# Patient Record
Sex: MALE | NOT HISPANIC OR LATINO | Employment: UNEMPLOYED | URBAN - METROPOLITAN AREA
[De-identification: names, ages, dates, MRNs, and addresses within clinical notes are randomized per-mention and may not be internally consistent; named-entity substitution may affect disease eponyms.]

---

## 2019-05-20 ENCOUNTER — TELEPHONE (OUTPATIENT)
Dept: GASTROENTEROLOGY | Facility: CLINIC | Age: 24
End: 2019-05-20

## 2019-05-22 ENCOUNTER — OFFICE VISIT (OUTPATIENT)
Dept: GASTROENTEROLOGY | Facility: CLINIC | Age: 24
End: 2019-05-22
Payer: COMMERCIAL

## 2019-05-22 VITALS
SYSTOLIC BLOOD PRESSURE: 121 MMHG | HEIGHT: 68 IN | TEMPERATURE: 96.6 F | WEIGHT: 140 LBS | BODY MASS INDEX: 21.22 KG/M2 | HEART RATE: 51 BPM | DIASTOLIC BLOOD PRESSURE: 66 MMHG

## 2019-05-22 DIAGNOSIS — R10.13 EPIGASTRIC ABDOMINAL PAIN: ICD-10-CM

## 2019-05-22 DIAGNOSIS — K21.9 GASTROESOPHAGEAL REFLUX DISEASE, ESOPHAGITIS PRESENCE NOT SPECIFIED: Primary | ICD-10-CM

## 2019-05-22 PROCEDURE — 99244 OFF/OP CNSLTJ NEW/EST MOD 40: CPT | Performed by: INTERNAL MEDICINE

## 2019-05-22 RX ORDER — PANTOPRAZOLE SODIUM 40 MG/1
40 TABLET, DELAYED RELEASE ORAL DAILY
Qty: 60 TABLET | Refills: 3 | Status: SHIPPED | OUTPATIENT
Start: 2019-05-22 | End: 2019-12-16

## 2019-05-29 ENCOUNTER — HOSPITAL ENCOUNTER (OUTPATIENT)
Dept: ULTRASOUND IMAGING | Facility: MEDICAL CENTER | Age: 24
Discharge: HOME | End: 2019-05-29
Attending: INTERNAL MEDICINE
Payer: COMMERCIAL

## 2019-05-29 DIAGNOSIS — K21.9 GASTROESOPHAGEAL REFLUX DISEASE, ESOPHAGITIS PRESENCE NOT SPECIFIED: ICD-10-CM

## 2019-05-29 PROCEDURE — 76705 ECHO EXAM OF ABDOMEN: CPT

## 2019-06-07 ENCOUNTER — ANESTHESIA EVENT (OUTPATIENT)
Dept: GASTROENTEROLOGY | Facility: MEDICAL CENTER | Age: 24
End: 2019-06-07

## 2019-06-10 ENCOUNTER — ANESTHESIA (OUTPATIENT)
Dept: GASTROENTEROLOGY | Facility: MEDICAL CENTER | Age: 24
End: 2019-06-10

## 2019-06-10 ENCOUNTER — HOSPITAL ENCOUNTER (OUTPATIENT)
Dept: GASTROENTEROLOGY | Facility: MEDICAL CENTER | Age: 24
Setting detail: OUTPATIENT SURGERY
Discharge: HOME | End: 2019-06-10
Attending: INTERNAL MEDICINE | Admitting: INTERNAL MEDICINE
Payer: COMMERCIAL

## 2019-06-10 VITALS
HEART RATE: 46 BPM | SYSTOLIC BLOOD PRESSURE: 125 MMHG | TEMPERATURE: 98.2 F | WEIGHT: 136 LBS | BODY MASS INDEX: 20.61 KG/M2 | OXYGEN SATURATION: 99 % | RESPIRATION RATE: 16 BRPM | HEIGHT: 68 IN | DIASTOLIC BLOOD PRESSURE: 73 MMHG

## 2019-06-10 DIAGNOSIS — K21.9 GASTROESOPHAGEAL REFLUX DISEASE, ESOPHAGITIS PRESENCE NOT SPECIFIED: ICD-10-CM

## 2019-06-10 PROCEDURE — 88305 TISSUE EXAM BY PATHOLOGIST: CPT | Performed by: PATHOLOGY

## 2019-06-10 PROCEDURE — 43239 EGD BIOPSY SINGLE/MULTIPLE: CPT | Performed by: INTERNAL MEDICINE

## 2019-06-10 RX ORDER — PROPOFOL 10 MG/ML
INJECTION, EMULSION INTRAVENOUS AS NEEDED
Status: DISCONTINUED | OUTPATIENT
Start: 2019-06-10 | End: 2019-06-10 | Stop reason: SURG

## 2019-06-10 RX ORDER — SODIUM CHLORIDE 9 MG/ML
125 INJECTION, SOLUTION INTRAVENOUS CONTINUOUS
Status: DISCONTINUED | OUTPATIENT
Start: 2019-06-10 | End: 2019-06-14 | Stop reason: HOSPADM

## 2019-06-10 RX ADMIN — PROPOFOL 50 MG: 10 INJECTION, EMULSION INTRAVENOUS at 13:59

## 2019-06-10 RX ADMIN — PROPOFOL 120 MG: 10 INJECTION, EMULSION INTRAVENOUS at 13:55

## 2019-06-10 RX ADMIN — SODIUM CHLORIDE 125 ML/HR: 0.9 INJECTION, SOLUTION INTRAVENOUS at 13:04

## 2019-12-16 ENCOUNTER — OFFICE VISIT (OUTPATIENT)
Dept: GASTROENTEROLOGY | Facility: CLINIC | Age: 24
End: 2019-12-16
Payer: COMMERCIAL

## 2019-12-16 VITALS
SYSTOLIC BLOOD PRESSURE: 122 MMHG | HEART RATE: 72 BPM | TEMPERATURE: 97.8 F | WEIGHT: 130 LBS | DIASTOLIC BLOOD PRESSURE: 68 MMHG | HEIGHT: 68 IN | BODY MASS INDEX: 19.7 KG/M2

## 2019-12-16 DIAGNOSIS — K21.9 GASTROESOPHAGEAL REFLUX DISEASE WITHOUT ESOPHAGITIS: ICD-10-CM

## 2019-12-16 DIAGNOSIS — R14.2 BELCHING: Primary | ICD-10-CM

## 2019-12-16 PROCEDURE — 99214 OFFICE O/P EST MOD 30 MIN: CPT | Performed by: PHYSICIAN ASSISTANT

## 2019-12-16 NOTE — PROGRESS NOTES
[facility] - Outpatient Follow-up Note  Davide Gomes 27 y o  male MRN: [id number]  Encounter: [id number]          ASSESSMENT AND PLAN:      1  Belchin  Epigastric discomfort:  3  GERD: he admits to post-prandial belching as well as epigastric discomfort and acid reflux  He states the continued belching leads to regurgitation of food  He underwent work up including cbc, cmp, h pylori, ttg IgA, u/s and EGD that were all normal  This is likely acid reflux  He tried protonix and zantac without relief  We will try nexium and pepcid  If no relief, we could consider 24 hour pH testing   -gerd diet handout given and discussed  - esomeprazole (NexIUM) 20 mg capsule; Take 1 capsule (20 mg total) by mouth 2 (two) times a day before meals  Dispense: 60 capsule; Refill: 3  -may use pepcid PRN for breakthrough symptoms  -consider 24 hr pH testing if no relief    ______________________________________________________________________    SUBJECTIVE:  Davide Gomes is a 28 yo male here for follow up of belching  He had returned from studying abroad and states at the end of his trip he started having severe belching after meals and abdominal discomfort  It has continued since he has returned home  Work up including h pylori, celiac, cbc, cmp, u/s and EGD with biopsy have all been normal  He denies pain, nausea, change in bowel habits  He denies change in appetite or weight loss  REVIEW OF SYSTEMS IS OTHERWISE NEGATIVE        Historical Information   Past Medical History:   Diagnosis Date    GERD (gastroesophageal reflux disease)      Past Surgical History:   Procedure Laterality Date    CARDIAC SURGERY      valve repair    EGD  06/10/2019    MYRINGOTOMY W/ TUBES Right 2018    Dr Nito Toussaint - office procedure    PATENT FORAMEN OVALE CLOSURE  1997    WISDOM TOOTH EXTRACTION       Social History   Social History     Substance and Sexual Activity   Alcohol Use Yes    Frequency: 2-4 times a month     Social History     Substance and Sexual Activity   Drug Use Yes    Types: Marijuana     Social History     Tobacco Use   Smoking Status Never Smoker   Smokeless Tobacco Never Used     Family History   Problem Relation Age of Onset    No Known Problems Mother     No Known Problems Father     Breast cancer Paternal Aunt     Stroke Paternal Uncle        Meds/Allergies       Current Outpatient Medications:     ciprofloxacin-dexamethasone (CIPRODEX) otic suspension    esomeprazole (NexIUM) 20 mg capsule    No Known Allergies        Objective     Blood pressure 122/68, pulse 72, temperature 97 8 °F (36 6 °C), temperature source Tympanic, height 5' 8" (1 727 m), weight 59 kg (130 lb)  Body mass index is 19 77 kg/m²  PHYSICAL EXAM:      General Appearance:   Alert, cooperative, no distress   HEENT:   Normocephalic, atraumatic, anicteric  Right eye exhibits no discharge  Left eye exhibits no discharge  No scleral icterus    Neck:  Supple, symmetrical, trachea midline, no stridor    Lungs:   Clear to auscultation bilaterally; no rales, rhonchi or wheezing; respirations unlabored    Heart[de-identified]   Regular rate and rhythm; no murmur, rub, or gallop  Abdomen:   Soft, non-tender, non-distended; normal bowel sounds; no masses, no organomegaly    Genitalia:   Deferred    Rectal:   Deferred    Extremities:  No cyanosis, clubbing or edema        Skin:  No jaundice, rashes, or lesions          Lab Results:   No visits with results within 1 Day(s) from this visit     Latest known visit with results is:   Hospital Outpatient Visit on 06/10/2019   Component Date Value    Case Report 06/10/2019                      Value:Surgical Pathology Report                         Case: [id number]                                   Authorizing Provider:  Efraín Hernandez DO        Collected:           06/10/2019 1358              Ordering Location:     [address] [place] [address]        Received:            06/10/2019 [address]124 [address] Endoscopy                                                     Pathologist:           Juan Jose Brown MD                                                                Specimens:   A) - Duodenum, duodenum biopsy/ normal                                                              B) - Stomach, stomach biopsy/ normal                                                       Final Diagnosis 06/10/2019                      Value: This result contains rich text formatting which cannot be displayed here   Note 06/10/2019                      Value: This result contains rich text formatting which cannot be displayed here   Additional Information 06/10/2019                      Value: This result contains rich text formatting which cannot be displayed here  [facility] Description 06/10/2019                      Value: This result contains rich text formatting which cannot be displayed here   Clinical Information 06/10/2019                      Value:Gastroesophageal reflux disease, esophagitis presence not specified         Radiology Results:   No results found

## 2020-08-11 ENCOUNTER — TELEPHONE (OUTPATIENT)
Dept: GASTROENTEROLOGY | Facility: AMBULARY SURGERY CENTER | Age: 25
End: 2020-08-11

## 2020-08-11 NOTE — TELEPHONE ENCOUNTER
Patients GI provider:  Dr Wharton [facility]    Number to return call: [phone]    Reason for call: Pt scheduled TEAM Virtual Visit     email confirmed    Scheduled procedure/appointment date if applicable: Apt/procedure 9/14/20

## 2020-09-14 ENCOUNTER — TELEMEDICINE (OUTPATIENT)
Dept: GASTROENTEROLOGY | Facility: CLINIC | Age: 25
End: 2020-09-14
Payer: COMMERCIAL

## 2020-09-14 VITALS — WEIGHT: 135 LBS | BODY MASS INDEX: 20.53 KG/M2

## 2020-09-14 DIAGNOSIS — K21.9 GASTROESOPHAGEAL REFLUX DISEASE WITHOUT ESOPHAGITIS: Primary | ICD-10-CM

## 2020-09-14 PROCEDURE — 99214 OFFICE O/P EST MOD 30 MIN: CPT | Performed by: INTERNAL MEDICINE

## 2020-09-14 RX ORDER — MELOXICAM 15 MG/1
15 TABLET ORAL DAILY
COMMUNITY
Start: 2020-08-13

## 2020-09-14 NOTE — PROGRESS NOTES
[facility] - Outpatient Follow-up Note  Shirley Garcia 27 y o  male MRN: [id number]  Encounter: [id number]          ASSESSMENT AND PLAN:      GERD  Belching  Regurgitation     His symptoms could be secondary to reflux but he has no significant change in his symptoms while taking PPI and H2RA  His EGD with gastric and duodenal biopsy were normal    We reviewed reflux precautions  Advised small and frequent diet  24 PH/impedance and manometry study for further evaluation for esophageal dysmotility and reflux  Follow up with Dr Everette Wade  ______________________________________________________________________    SUBJECTIVE:  27year old seen for follow up visit for GERD  He continues to have epigastric discomfort and regurgitation  His symptoms are worse after eating greasy/oily foods  He has no nausea or vomiting but reports sour taste  Sometimes her regurgitate undigested food  No bloating or constipation  He has bowel movement every day  1-2 loose bowel movement per day    Stopped esomeprazole and nexium coupe of weeks ago    No weight loss  No dysphagia  No chest pain  He had normal EGD with biopsy from stomach and duodenum    REVIEW OF SYSTEMS IS OTHERWISE NEGATIVE        Historical Information   Past Medical History:   Diagnosis Date    GERD (gastroesophageal reflux disease)      Past Surgical History:   Procedure Laterality Date    CARDIAC SURGERY      valve repair    EGD  06/10/2019    MYRINGOTOMY W/ TUBES Right 12/19/2018    Dr Radha Viera - office procedure    PATENT FORAMEN OVALE CLOSURE  1997    WISDOM TOOTH EXTRACTION       Social History   Social History     Substance and Sexual Activity   Alcohol Use Yes    Frequency: 2-4 times a month     Social History     Substance and Sexual Activity   Drug Use Yes    Types: Marijuana     Social History     Tobacco Use   Smoking Status Never Smoker   Smokeless Tobacco Never Used     Family History   Problem Relation Age of Onset  No Known Problems Mother     No Known Problems Father     Breast cancer Paternal Aunt     Stroke Paternal Uncle        Meds/Allergies       Current Outpatient Medications:     meloxicam (MOBIC) 15 mg tablet    ciprofloxacin-dexamethasone (CIPRODEX) otic suspension    esomeprazole (NexIUM) 20 mg capsule    Allergies   Allergen Reactions    Pollen Extract Sneezing           Objective     Weight 61 2 kg (135 lb)  Body mass index is 20 53 kg/m²  PHYSICAL EXAM:      General Appearance:   Alert, cooperative, no distress   HEENT:   Normocephalic, atraumatic, anicteric      Neck:  Supple, symmetrical, trachea midline   Lungs:   Clear to auscultation bilaterally; no rales, rhonchi or wheezing; respirations unlabored    Heart[de-identified]   Regular rate and rhythm; no murmur, rub, or gallop  Abdomen:   Soft, non-tender, non-distended; normal bowel sounds; no masses, no organomegaly    Genitalia:   Deferred    Rectal:   Deferred    Extremities:  No cyanosis, clubbing or edema    Pulses:  2+ and symmetric    Skin:  No jaundice, rashes, or lesions    Lymph nodes:  No palpable cervical lymphadenopathy        Lab Results:   No visits with results within 1 Day(s) from this visit     Latest known visit with results is:   Hospital Outpatient Visit on 06/10/2019   Component Date Value    Case Report 06/10/2019                      Value:Surgical Pathology Report                         Case: [id number]                                   Authorizing Provider:  Yogesh Randall DO        Collected:           06/10/2019 1358              Ordering Location:     [facility]        Received:            06/10/2019 8750                                     [address] [place] [address] Endoscopy                                                     Pathologist:           Camilla Uerna MD                                                                Specimens:   A) - Duodenum, duodenum biopsy/ normal B) - Stomach, stomach biopsy/ normal                                                       Final Diagnosis 06/10/2019                      Value: This result contains rich text formatting which cannot be displayed here   Note 06/10/2019                      Value: This result contains rich text formatting which cannot be displayed here   Additional Information 06/10/2019                      Value: This result contains rich text formatting which cannot be displayed here  [facility] Gross Description 06/10/2019                      Value: This result contains rich text formatting which cannot be displayed here   Clinical Information 06/10/2019                      Value:Gastroesophageal reflux disease, esophagitis presence not specified         Radiology Results:   No results found  Diminished

## 2020-10-14 ENCOUNTER — HOSPITAL ENCOUNTER (OUTPATIENT)
Dept: GASTROENTEROLOGY | Facility: HOSPITAL | Age: 25
Discharge: HOME | End: 2020-10-14
Attending: INTERNAL MEDICINE
Payer: COMMERCIAL

## 2020-10-14 VITALS
HEART RATE: 54 BPM | OXYGEN SATURATION: 98 % | TEMPERATURE: 98.4 F | RESPIRATION RATE: 15 BRPM | SYSTOLIC BLOOD PRESSURE: 124 MMHG | DIASTOLIC BLOOD PRESSURE: 77 MMHG

## 2020-10-14 DIAGNOSIS — K21.9 GASTROESOPHAGEAL REFLUX DISEASE WITHOUT ESOPHAGITIS: ICD-10-CM

## 2020-10-14 PROCEDURE — 91020 GASTRIC MOTILITY STUDIES: CPT

## 2020-10-14 PROCEDURE — 91038 ESOPH IMPED FUNCT TEST > 1HR: CPT

## 2020-10-22 ENCOUNTER — OFFICE VISIT (OUTPATIENT)
Dept: GASTROENTEROLOGY | Facility: CLINIC | Age: 25
End: 2020-10-22

## 2020-10-22 ENCOUNTER — PREP FOR PROCEDURE (OUTPATIENT)
Dept: GASTROENTEROLOGY | Facility: CLINIC | Age: 25
End: 2020-10-22

## 2020-10-22 VITALS
SYSTOLIC BLOOD PRESSURE: 120 MMHG | TEMPERATURE: 98.6 F | BODY MASS INDEX: 20.76 KG/M2 | HEIGHT: 68 IN | WEIGHT: 137 LBS | HEART RATE: 66 BPM | DIASTOLIC BLOOD PRESSURE: 60 MMHG

## 2020-10-22 DIAGNOSIS — R14.2 BELCHING: Primary | ICD-10-CM

## 2020-10-22 PROCEDURE — 99214 OFFICE O/P EST MOD 30 MIN: CPT | Performed by: PHYSICIAN ASSISTANT

## 2020-10-24 PROCEDURE — 91010 ESOPHAGUS MOTILITY STUDY: CPT | Performed by: INTERNAL MEDICINE

## 2020-11-02 ENCOUNTER — HOSPITAL ENCOUNTER (OUTPATIENT)
Dept: RADIOLOGY | Facility: HOSPITAL | Age: 25
Discharge: HOME | End: 2020-11-02
Payer: COMMERCIAL

## 2020-11-02 DIAGNOSIS — R14.2 BELCHING: ICD-10-CM

## 2020-11-02 PROCEDURE — 74220 X-RAY XM ESOPHAGUS 1CNTRST: CPT

## 2020-12-01 ENCOUNTER — HOSPITAL ENCOUNTER (OUTPATIENT)
Dept: GASTROENTEROLOGY | Facility: HOSPITAL | Age: 25
Setting detail: OUTPATIENT SURGERY
Discharge: HOME | End: 2020-12-01

## 2020-12-08 ENCOUNTER — OFFICE VISIT (OUTPATIENT)
Dept: GASTROENTEROLOGY | Facility: CLINIC | Age: 25
End: 2020-12-08
Payer: COMMERCIAL

## 2020-12-08 VITALS
DIASTOLIC BLOOD PRESSURE: 60 MMHG | TEMPERATURE: 98 F | SYSTOLIC BLOOD PRESSURE: 118 MMHG | WEIGHT: 137 LBS | HEIGHT: 68 IN | BODY MASS INDEX: 20.76 KG/M2

## 2020-12-08 DIAGNOSIS — K21.9 GASTROESOPHAGEAL REFLUX DISEASE WITHOUT ESOPHAGITIS: Primary | ICD-10-CM

## 2020-12-08 DIAGNOSIS — R14.2 BURPING: ICD-10-CM

## 2020-12-08 PROCEDURE — 99214 OFFICE O/P EST MOD 30 MIN: CPT | Performed by: INTERNAL MEDICINE

## 2021-03-05 ENCOUNTER — TRANSCRIBE ORDERS (OUTPATIENT)
Dept: GASTROENTEROLOGY | Facility: CLINIC | Age: 26
End: 2021-03-05

## 2021-03-05 ENCOUNTER — OFFICE VISIT (OUTPATIENT)
Dept: GASTROENTEROLOGY | Facility: CLINIC | Age: 26
End: 2021-03-05
Payer: COMMERCIAL

## 2021-03-05 VITALS
WEIGHT: 138 LBS | TEMPERATURE: 97.6 F | BODY MASS INDEX: 20.92 KG/M2 | SYSTOLIC BLOOD PRESSURE: 110 MMHG | DIASTOLIC BLOOD PRESSURE: 70 MMHG | HEART RATE: 60 BPM | HEIGHT: 68 IN

## 2021-03-05 DIAGNOSIS — R10.13 DYSPEPSIA: Primary | ICD-10-CM

## 2021-03-05 DIAGNOSIS — R11.10 REGURGITATION OF FOOD: ICD-10-CM

## 2021-03-05 DIAGNOSIS — R14.2 BELCHING: ICD-10-CM

## 2021-03-05 PROCEDURE — 99214 OFFICE O/P EST MOD 30 MIN: CPT | Performed by: PHYSICIAN ASSISTANT

## 2021-03-05 NOTE — PROGRESS NOTES
[facility]  Demian Roldan 23 y o  male MRN: [id number]  Encounter: [id number]          ASSESSMENT AND PLAN:      1  Dyspepsia  2  Belching  3  Regurgitation of food  He reports persistent belching, regurgitation, epigastric discomfort after eating  He had EGD with gastric and duodenal biopsies that were unremarkable  RUQ ultrasound was normal  Barium swallow was significant for GERD  Esophageal manometry testing was normal  He could not tolerate 24 hour pH testing  He was recommended Bravo, but this has not been completed yet  I would recommend Bravo as the next step to determine if he has pathologic acid reflux  If so, he may be a candidate for endoscopic or surgical treatment for reflux  If his Bravo test is negative, he may benefit from TCA for treatment of visceral hypersensitivity  In the meantime, he can take Gas-X as needed and avoid high fat foods which trigger his symptoms  He states he is moving to [place] on Friday  We will send records to his new GI doctor  Follow-up as needed since he is moving to [address] [place] [address]  ______________________________________________________________________    SUBJECTIVE:    62-year-old male presenting for follow-up of GERD, belching, regurgitation  He continues to report the same symptoms  He has frequent "deep" belches and is belching in the room today  Sometimes with the belches, he will regurgitate food  He has occasional heartburn with the regurgitation  He has epigastric discomfort after eating most foods  He denies pain, but reports more pressure symptoms  He has minor bloating  He denies nausea and vomiting  He denies abdominal pain  He denies diarrhea  He had no relief with PPI therapy  REVIEW OF SYSTEMS IS OTHERWISE NEGATIVE        Historical Information   Past Medical History:   Diagnosis Date    GERD (gastroesophageal reflux disease)      Past Surgical History:   Procedure Laterality Date    CARDIAC SURGERY      valve repair    EGD  06/10/2019    MYRINGOTOMY W/ TUBES Right 12/19/2018    Dr Duncan Haw - office procedure    PATENT FORAMEN OVALE CLOSURE  1997    UPPER GASTROINTESTINAL ENDOSCOPY      WISDOM TOOTH EXTRACTION       Social History   Social History     Substance and Sexual Activity   Alcohol Use Yes    Frequency: 2-4 times a month     Social History     Substance and Sexual Activity   Drug Use Yes    Types: Marijuana     Social History     Tobacco Use   Smoking Status Never Smoker   Smokeless Tobacco Never Used     Family History   Problem Relation Age of Onset    No Known Problems Mother     No Known Problems Father     Breast cancer Paternal Aunt     Stroke Paternal Uncle        Meds/Allergies       Current Outpatient Medications:     ciprofloxacin-dexamethasone (CIPRODEX) otic suspension    meloxicam (MOBIC) 15 mg tablet    Allergies   Allergen Reactions    Pollen Extract Sneezing           Objective     Blood pressure 110/70, pulse 60, temperature 97 6 °F (36 4 °C), temperature source Tympanic, height 5' 8" (1 727 m), weight 62 6 kg (138 lb)  Body mass index is 20 98 kg/m²  PHYSICAL EXAM:      General Appearance:   Alert, cooperative, no distress   HEENT:   Normocephalic, atraumatic, anicteric      Neck:  Supple, symmetrical, trachea midline   Lungs:   Clear to auscultation bilaterally; no rales, rhonchi or wheezing; respirations unlabored    Heart[de-identified]   Regular rate and rhythm; no murmur, rub, or gallop  Abdomen:   Soft, non-tender, non-distended; normal bowel sounds; no masses, no organomegaly    Genitalia:   Deferred    Rectal:   Deferred    Extremities:  No cyanosis, clubbing or edema    Pulses:  2+ and symmetric    Skin:  No jaundice, rashes, or lesions    Lymph nodes:  No palpable cervical lymphadenopathy        Lab Results:   No visits with results within 1 Day(s) from this visit     Latest known visit with results is:   Hospital Outpatient Visit on 06/10/2019   Component Date Value    Case Report 06/10/2019                      Value:Surgical Pathology Report                         Case: [id number]                                   Authorizing Provider:  Jessica Goncalves DO        Collected:           06/10/2019 1358              Ordering Location:     [facility]        Received:            06/10/2019 0171                                     [address] Endoscopy                                                     Pathologist:           Bola Riley MD                                                                Specimens:   A) - Duodenum, duodenum biopsy/ normal                                                              B) - Stomach, stomach biopsy/ normal                                                       Final Diagnosis 06/10/2019                      Value: This result contains rich text formatting which cannot be displayed here   Note 06/10/2019                      Value: This result contains rich text formatting which cannot be displayed here   Additional Information 06/10/2019                      Value: This result contains rich text formatting which cannot be displayed here  [facility] Gross Description 06/10/2019                      Value: This result contains rich text formatting which cannot be displayed here   Clinical Information 06/10/2019                      Value:Gastroesophageal reflux disease, esophagitis presence not specified         Radiology Results:   No results found

## 2021-03-05 NOTE — LETTER
March 5, 2021     [facility], [address] [place] [address]    Patient: Demian Roldan   YOB: 1995   Date of Visit: 3/5/2021       Dear Dr Abel Christianson:    Thank you for referring Demian Roldan to me for evaluation  Below are my notes for this consultation  If you have questions, please do not hesitate to call me  I look forward to following your patient along with you  Sincerely,        Ramesh Ledesma PA-C        CC: No Recipients  Ramesh Ledesma PA-C  3/5/2021  9:29 AM  Sign when Signing Visit  [facility] - Outpatient Follow-up Note  Demian Roldan 23 y o  male MRN: [id number]  Encounter: [id number]          ASSESSMENT AND PLAN:      1  Dyspepsia  2  Belching  3  Regurgitation of food  He reports persistent belching, regurgitation, epigastric discomfort after eating  He had EGD with gastric and duodenal biopsies that were unremarkable  RUQ ultrasound was normal  Barium swallow was significant for GERD  Esophageal manometry testing was normal  He could not tolerate 24 hour pH testing  He was recommended Bravo, but this has not been completed yet  I would recommend Bravo as the next step to determine if he has pathologic acid reflux  If so, he may be a candidate for endoscopic or surgical treatment for reflux  If his Bravo test is negative, he may benefit from TCA for treatment of visceral hypersensitivity  In the meantime, he can take Gas-X as needed and avoid high fat foods which trigger his symptoms  He states he is moving to [place] on Friday  We will send records to his new GI doctor  Follow-up as needed since he is moving to [address] [place] [address]  ______________________________________________________________________    SUBJECTIVE:    21-year-old male presenting for follow-up of GERD, belching, regurgitation  He continues to report the same symptoms  He has frequent "deep" belches and is belching in the room today    Sometimes with the belches, he will regurgitate food  He has occasional heartburn with the regurgitation  He has epigastric discomfort after eating most foods  He denies pain, but reports more pressure symptoms  He has minor bloating  He denies nausea and vomiting  He denies abdominal pain  He denies diarrhea  He had no relief with PPI therapy  REVIEW OF SYSTEMS IS OTHERWISE NEGATIVE  Historical Information   Past Medical History:   Diagnosis Date    GERD (gastroesophageal reflux disease)      Past Surgical History:   Procedure Laterality Date    CARDIAC SURGERY      valve repair    EGD  06/10/2019    MYRINGOTOMY W/ TUBES Right 12/19/2018    Dr Tati Rosas - office procedure    PATENT FORAMEN OVALE CLOSURE  1997    UPPER GASTROINTESTINAL ENDOSCOPY      WISDOM TOOTH EXTRACTION       Social History   Social History     Substance and Sexual Activity   Alcohol Use Yes    Frequency: 2-4 times a month     Social History     Substance and Sexual Activity   Drug Use Yes    Types: Marijuana     Social History     Tobacco Use   Smoking Status Never Smoker   Smokeless Tobacco Never Used     Family History   Problem Relation Age of Onset    No Known Problems Mother     No Known Problems Father     Breast cancer Paternal Aunt     Stroke Paternal Uncle        Meds/Allergies       Current Outpatient Medications:     ciprofloxacin-dexamethasone (CIPRODEX) otic suspension    meloxicam (MOBIC) 15 mg tablet    Allergies   Allergen Reactions    Pollen Extract Sneezing           Objective     Blood pressure 110/70, pulse 60, temperature 97 6 °F (36 4 °C), temperature source Tympanic, height 5' 8" (1 727 m), weight 62 6 kg (138 lb)  Body mass index is 20 98 kg/m²        PHYSICAL EXAM:      General Appearance:   Alert, cooperative, no distress   HEENT:   Normocephalic, atraumatic, anicteric      Neck:  Supple, symmetrical, trachea midline   Lungs:   Clear to auscultation bilaterally; no rales, rhonchi or wheezing; respirations unlabored  Heart[de-identified]   Regular rate and rhythm; no murmur, rub, or gallop  Abdomen:   Soft, non-tender, non-distended; normal bowel sounds; no masses, no organomegaly    Genitalia:   Deferred    Rectal:   Deferred    Extremities:  No cyanosis, clubbing or edema    Pulses:  2+ and symmetric    Skin:  No jaundice, rashes, or lesions    Lymph nodes:  No palpable cervical lymphadenopathy        Lab Results:   No visits with results within 1 Day(s) from this visit  Latest known visit with results is:   Hospital Outpatient Visit on 06/10/2019   Component Date Value    Case Report 06/10/2019                      Value:Surgical Pathology Report                         Case: [id number]                                   Authorizing Provider:  Moose Lazaro DO        Collected:           06/10/2019 1358              Ordering Location[facility]     [facility]        Received:            06/10/2019 [address] [place] [address] Endoscopy                                                     Pathologist:           Karo Parisi MD                                                                Specimens:   A) - Duodenum, duodenum biopsy/ normal                                                              B) - Stomach, stomach biopsy/ normal                                                       Final Diagnosis 06/10/2019                      Value: This result contains rich text formatting which cannot be displayed here   Note 06/10/2019                      Value: This result contains rich text formatting which cannot be displayed here   Additional Information 06/10/2019                      Value: This result contains rich text formatting which cannot be displayed here  [facility] Description 06/10/2019                      Value: This result contains rich text formatting which cannot be displayed here      Clinical Information 06/10/2019                      Value:Gastroesophageal reflux disease, esophagitis presence not specified         Radiology Results:   No results found

## 2021-03-05 NOTE — PATIENT INSTRUCTIONS
When you get to [place] and see the GI doctor, please ask about a BRAVO study to evaluate for acid reflux  You may also benefit from having a gastric emptying study done to assess for slow emptying of the stomach  We would recommend avoiding high fat foods which trigger your symptoms  You can try Gas-X to relieve the bloating and belching